# Patient Record
Sex: MALE | Race: WHITE | NOT HISPANIC OR LATINO
[De-identification: names, ages, dates, MRNs, and addresses within clinical notes are randomized per-mention and may not be internally consistent; named-entity substitution may affect disease eponyms.]

---

## 2022-08-02 ENCOUNTER — APPOINTMENT (OUTPATIENT)
Dept: BURN CARE | Facility: CLINIC | Age: 87
End: 2022-08-02
Payer: MEDICARE

## 2022-08-02 PROBLEM — Z00.00 ENCOUNTER FOR PREVENTIVE HEALTH EXAMINATION: Status: ACTIVE | Noted: 2022-08-02

## 2022-08-02 PROCEDURE — XXXXX: CPT | Mod: 1L

## 2022-08-09 ENCOUNTER — APPOINTMENT (OUTPATIENT)
Dept: BURN CARE | Facility: CLINIC | Age: 87
End: 2022-08-09

## 2022-08-09 PROCEDURE — 97597 DBRDMT OPN WND 1ST 20 CM/<: CPT

## 2022-08-23 ENCOUNTER — APPOINTMENT (OUTPATIENT)
Dept: BURN CARE | Facility: CLINIC | Age: 87
End: 2022-08-23

## 2022-08-23 PROCEDURE — 99212 OFFICE O/P EST SF 10 MIN: CPT

## 2022-11-22 NOTE — PHYSICAL EXAM
[Healing] : healing [Size%: ______] : Size: [unfilled]% [Infected?] : Infected: Yes [3] : 3 out of 10 [Abnormal] : abnormal [Medium] : medium [] : yes [de-identified] : traumatic wound left hand -. healing - excisional debridement with scissors devitalized tissue to subcutaneous tissue   -> local wound care \par \par 5x5cm \par \par follow up 2 - 4  weeks  [TWNoteComboBox1] : [employer]

## 2022-11-22 NOTE — HISTORY OF PRESENT ILLNESS
[Did you have an operation on your burn/wound injury?] : Did you have an operation on your burn/wound injury? No [Did this injury occur on the job?] : Did this injury occur on the job? No [de-identified] : traumatic wound left hand  [de-identified] : healing

## 2022-11-22 NOTE — REASON FOR VISIT
[Initial] : initial visit [Were you seen in the Emergency Room?] : seen in the emergency room [Were you admitted to the burn center at [facility]?] : not admitted to the burn center at [facility]

## 2022-11-22 NOTE — ASSESSMENT
[FreeTextEntry1] : traumatic wound left hand -. healing - excisional debridement with scissors devitalized tissue to subcutaneous tissue   -> local wound care \par \par 5x5cm \par \par follow up 2 - 4  weeks  [Wound Care] : wound care